# Patient Record
Sex: FEMALE
[De-identification: names, ages, dates, MRNs, and addresses within clinical notes are randomized per-mention and may not be internally consistent; named-entity substitution may affect disease eponyms.]

---

## 2022-09-18 ENCOUNTER — HOSPITAL ENCOUNTER (EMERGENCY)
Dept: HOSPITAL 97 - ER | Age: 47
Discharge: HOME | End: 2022-09-18
Payer: SELF-PAY

## 2022-09-18 DIAGNOSIS — M54.32: Primary | ICD-10-CM

## 2022-09-18 LAB
BUN BLD-MCNC: 6 MG/DL (ref 7–18)
GLUCOSE SERPLBLD-MCNC: 122 MG/DL (ref 74–106)
HCT VFR BLD CALC: 41.4 % (ref 36–45)
LYMPHOCYTES # SPEC AUTO: 1.4 K/UL (ref 0.7–4.9)
MCV RBC: 93.4 FL (ref 80–100)
PMV BLD: 9.4 FL (ref 7.6–11.3)
POTASSIUM SERPL-SCNC: 3.5 MMOL/L (ref 3.5–5.1)
RBC # BLD: 4.44 M/UL (ref 3.86–4.86)
SP GR UR: 1.02 (ref 1–1.03)

## 2022-09-18 PROCEDURE — 81003 URINALYSIS AUTO W/O SCOPE: CPT

## 2022-09-18 PROCEDURE — 96375 TX/PRO/DX INJ NEW DRUG ADDON: CPT

## 2022-09-18 PROCEDURE — 96374 THER/PROPH/DIAG INJ IV PUSH: CPT

## 2022-09-18 PROCEDURE — 81025 URINE PREGNANCY TEST: CPT

## 2022-09-18 PROCEDURE — 72131 CT LUMBAR SPINE W/O DYE: CPT

## 2022-09-18 PROCEDURE — 99284 EMERGENCY DEPT VISIT MOD MDM: CPT

## 2022-09-18 PROCEDURE — 36415 COLL VENOUS BLD VENIPUNCTURE: CPT

## 2022-09-18 PROCEDURE — 80048 BASIC METABOLIC PNL TOTAL CA: CPT

## 2022-09-18 PROCEDURE — 85025 COMPLETE CBC W/AUTO DIFF WBC: CPT

## 2022-09-18 NOTE — RAD REPORT
EXAM DESCRIPTION:  CT - Spine Lumbar Wo Con - 9/18/2022 4:01 pm

 

CLINICAL HISTORY:  radicular pain

 

COMPARISON:  None.

 

TECHNIQUE:  Thin section axial imaging of the lumbar spine was performed.  Sagittal and coronal recon
struction images were generated and reviewed.

 

All CT scans are performed using dose optimization technique as appropriate and may include automated
 exposure control or mA/KV adjustment according to patient size.

 

FINDINGS:  Lumbar bodies are normal in height. No acute fracture. No lytic, sclerotic or expansile barbara
ny destructive process. There is a very slight retrolisthesis of L1 on L2. Endplate spurs are scatter
ed throughout the lumbar spine. No pathologic bone process seen.

 

L1-2 level: Mild central canal disc bulge is seen with left foraminal disc bulge. Midline canal is 10
 mm. Left foraminal encroachment is minimal.

L2-3 level: Slight loss in disc height noted. Degenerative gas in the disc space. Mild bulging of dis
c material seen across the central canal and into each exit foramen. Bulging disc is more prominent o
n the left. There is facet degenerative change and ligamentous thickening present. Left foraminal aria
nosis is present.

L3-4 level: Mild bulging of disc material is present in the central canal and right exit foramen. Rig
ht facet hypertrophy is present with mild right foraminal stenosis. Central canal is borderline steno
tic. Degenerative gas is present in the disc space.

L4-5 disc level: Midline disc bulge is present. No foraminal stenosis. Facet joint degenerative sebastian
es are present.

L5-S1 level: Slight anterior subluxation of L5 on S1 due to advanced facet joint degenerative change.
 No pars defects seen. Mild bilateral foraminal stenosis present. No central spinal stenosis.

 

IMPRESSION:  No fracture or acute vertebral body finding identifiable.

 

Disc bulge changes are present at multiple levels with borderline central spinal stenosis and multipl
e levels of foraminal stenosis.

 

Central canal detail is inherently limited. Follow-up outpatient MR imaging may be helpful to more se
nsitively assess affects of protruding disc material.

## 2022-09-18 NOTE — EDPHYS
Physician Documentation                                                                           

 [facility]                                                                 

Name: Dorene Zamora                                                                             

Age: 47 yrs                                                                                       

Sex: Female                                                                                       

: 1975                                                                                   

MRN: [id number]                                                                                   

Arrival Date: 2022                                                                          

Time: 15:07                                                                                       

Account#: [id number]                                                                            

Bed Treatment                                                                                     

Private MD:                                                                                       

ED Physician Danny Babcock                                                                         

HPI:                                                                                              

                                                                                             

15:35 This 47 yrs old  Female presents to ER via Ambulatory with complaints of Leg    jmm 

      Pain, Numbness - left leg.                                                                  

15:35 The patient presents with pain. Onset: The symptoms/episode began/occurred gradually, 2 jmm 

      week(s) ago. Modifying factors: The symptoms are alleviated by nothing. the symptoms        

      are aggravated by movement, weight bearing. This is a 47 year old female that presents      

      to the ED with complaints of left lower back pain which radiates into the left leg.         

      Patient states over the past 4 days developing numbness as well. Patient states having      

      a back injury as a child. .                                                                 

15:35 Patient denies bowel or bladder issues.                                                 jmm 

                                                                                                  

Historical:                                                                                       

- Allergies:                                                                                      

15:15 NKA;                                                                                    hb  

                                                                                                  

                                                                                                  

                                                                                                  

ROS:                                                                                              

15:35 Constitutional: Negative for fever, chills, and weight loss, Cardiovascular: Negative   jmm 

      for chest pain, palpitations, and edema, Respiratory: Negative for shortness of breath,     

      cough, wheezing, and pleuritic chest pain.                                                  

15:35 Back: Positive for pain with movement.                                                      

15:35 MS/extremity: Positive for pain.                                                            

15:35 All other systems are negative.                                                             

                                                                                                  

Exam:                                                                                             

15:35 Head/Face:  atraumatic. Eyes:  EOMI, no conjunctival erythema appreciated ENT:  Moist   jmm 

      Mucus Membranes Neck:  Trachea midline, Supple Chest/axilla:  Normal chest wall             

      appearance and motion.   Cardiovascular:  Regular rate and rhythm.  No edema                

      appreciated Respiratory:  Normal respirations, no respiratory distress appreciated          

15:35 Skin:  General appearance color normal                                                      

15:35 Constitutional: The patient appears alert, awake, anxious, uncomfortable.                   

15:35 Abdomen/GI: Inspection: obese Palpation: abdomen is soft and non-tender, in all             

      quadrants.                                                                                  

15:35 Back: pain, that is moderate, of the  lumbar area.                                          

15:35 Musculoskeletal/extremity: ROM: intact in all extremities.                                  

15:35 Skin: Appearance: Color: normal in color.                                                   

15:35 Neuro: Motor: is normal.                                                                    

15:35 Psych: Behavior/mood is anxious.                                                            

                                                                                                  

Vital Signs:                                                                                      

15:12  / 105; Pulse 99; Resp 18; Temp 98.4; Pulse Ox 100% on R/A; Weight 117.93 kg;     hb  

      Height 5 ft. 2 in. (157.48 cm); Pain 10/10;                                                 

16:30  / 109; Pulse 76; Resp 20; Pulse Ox 96% on R/A; Pain 8/10;                        ss  

15:12 Body Mass Index 47.55 (117.93 kg, 157.48 cm)                                            hb  

                                                                                                  

MDM:                                                                                              

15:25 Patient medically screened.                                                             [facility] 

17:11 Data reviewed: vital signs, nurses notes. Counseling: I had a detailed discussion with  earlene 

      the patient and/or guardian regarding: the historical points, exam findings, and any        

      diagnostic results supporting the discharge/admit diagnosis, lab results, radiology         

      results, the need for outpatient follow up, to return to the emergency department if        

      symptoms worsen or persist or if there are any questions or concerns that arise at          

      home. ED course: Pain relieved in the ED. Advised follow-up with spine, pain                

      management, her PCP. Given strict return precautions. Patient understood agrees plan of     

      care. I do not currently suspect cauda equina or cord compression..                         

17:12 Counseling: I had a detailed discussion with the patient and/or guardian regarding:     earlene 

      smoking cessation.                                                                          

                                                                                                  

                                                                                             

15:35 Order name: CBC with Diff; Complete Time: 16:49                                         [facility] 

                                                                                             

15:35 Order name: BMP; Complete Time: 16:50                                                   [facility] 

                                                                                             

15:32 Order name: CT Lumbar Spine Wo Con; Complete Time: 16:27                                [facility] 

                                                                                             

15:45 Order name: Urine Dipstick-Ancillary; Complete Time: 15:46                              [facility]

                                                                                             

15:46 Order name: Urine Pregnancy--Ancillary (enter results); Complete Time: 15:57            eb  

                                                                                             

15:31 Order name: Saline Lock; Complete Time: 16:26                                           [facility] 

                                                                                                  

Administered Medications:                                                                         

16:22 Drug: Dilaudid (HYDROmorphone) 1 mg Route: IVP; Site: left antecubital;                 ss  

16:24 Drug: Ketorolac 30 mg Route: IVP; Site: left antecubital;                               ss  

16:26 Drug: Valium (diazepam) 5 mg Route: IVP; Site: left antecubital;                        ss  

16:26 Drug: Decadron - Dexamethasone 10 mg Route: IVP; Site: left antecubital;                ss  

                                                                                                  

                                                                                                  

Disposition:                                                                                      

17:24 Co-signature as Attending Physician, Danny Babcock DO I was immediately available onsite ms3 

      in the emergency department for consultation in the care of the patient.                    

                                                                                                  

Disposition Summary:                                                                              

22 17:13                                                                                    

Discharge Ordered                                                                                 

      Location: Home                                                                          [facility] 

      Condition: Stable                                                                       [facility] 

      Diagnosis                                                                                   

        - Sciatica, left side                                                                 jm 

      Followup:                                                                               [facility] 

        - With: Aleks Valderrama,                                                                   

        - When: 2 - 3 days                                                                         

        - Reason: Recheck today's complaints, Continuance of care, Re-evaluation by your           

      physician                                                                                   

      Discharge Instructions:                                                                     

        - Discharge Summary Sheet                                                             [facility] 

        - Sciatica                                                                            [facility] 

      Forms:                                                                                      

        - Medication Reconciliation Form                                                      [facility] 

        - Thank You Letter                                                                    [facility] 

        - Antibiotic Education                                                                [facility] 

        - Prescription Opioid Use                                                             [facility] 

      Prescriptions:                                                                              

        - Prednisone 20 mg Oral Tablet                                                             

            - take 3 tablets by ORAL route once daily for 5 days; 15 tablet; Refills: 0,      [facility] 

      Product Selection Permitted                                                                 

        - Zanaflex 4 mg Oral Tablet                                                                

            - take 1 tablet by ORAL route every 8 hours As needed; 20 tablet; Refills: 0,     [facility] 

      Product Selection Permitted                                                                 

        - Diclofenac Sodium 75 mg Oral Tablet Sustained Release                                    

            - take 1 tablet by ORAL route 2 times per day; 30 tablet; Refills: 0, Product     [facility] 

      Selection Permitted                                                                         

        - Tramadol 50 mg Oral Tablet                                                               

            - take 1 tablet by ORAL route every 8 hours as needed; 12 tablet; Refills: 0,     [facility] 

      Product Selection Permitted                                                                 

Signatures:                                                                                       

Dispatcher [employer]                           EDJose Antonio Gamez PA PA jmm Smirch, Shelby, RN RN                                                      

Lacie Beal RN RN                                                      

Danny Babcock DO DO   ms3                                                  

                                                                                                  

**************************************************************************************************
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
## 2022-09-20 VITALS — OXYGEN SATURATION: 96 % | SYSTOLIC BLOOD PRESSURE: 189 MMHG | DIASTOLIC BLOOD PRESSURE: 109 MMHG

## 2022-09-20 VITALS — TEMPERATURE: 98.4 F

## 2023-02-07 LAB
BUN BLD-MCNC: 8 MG/DL (ref 7–18)
GLUCOSE SERPLBLD-MCNC: 107 MG/DL (ref 74–106)
POTASSIUM SERPL-SCNC: 3.6 MMOL/L (ref 3.5–5.1)

## 2023-02-09 ENCOUNTER — HOSPITAL ENCOUNTER (OUTPATIENT)
Dept: HOSPITAL 97 - OR | Age: 48
Discharge: HOME | End: 2023-02-09
Attending: SURGERY
Payer: COMMERCIAL

## 2023-02-09 VITALS — DIASTOLIC BLOOD PRESSURE: 91 MMHG | SYSTOLIC BLOOD PRESSURE: 162 MMHG

## 2023-02-09 VITALS — OXYGEN SATURATION: 99 %

## 2023-02-09 VITALS — TEMPERATURE: 97 F

## 2023-02-09 DIAGNOSIS — K42.0: Primary | ICD-10-CM

## 2023-02-09 PROCEDURE — 36415 COLL VENOUS BLD VENIPUNCTURE: CPT

## 2023-02-09 PROCEDURE — 0WUF4JZ SUPPLEMENT ABDOMINAL WALL WITH SYNTHETIC SUBSTITUTE, PERCUTANEOUS ENDOSCOPIC APPROACH: ICD-10-PCS

## 2023-02-09 PROCEDURE — 80048 BASIC METABOLIC PNL TOTAL CA: CPT

## 2023-02-09 PROCEDURE — 93005 ELECTROCARDIOGRAM TRACING: CPT

## 2023-02-09 RX ADMIN — CEFAZOLIN ONE GM: 2 INJECTION, POWDER, FOR SOLUTION INTRAMUSCULAR; INTRAVENOUS at 09:40

## 2023-02-09 RX ADMIN — HYDROMORPHONE HYDROCHLORIDE ONE MG: 2 INJECTION INTRAMUSCULAR; INTRAVENOUS; SUBCUTANEOUS at 11:04

## 2023-02-09 RX ADMIN — HYDROMORPHONE HYDROCHLORIDE ONE MG: 1 INJECTION, SOLUTION INTRAMUSCULAR; INTRAVENOUS; SUBCUTANEOUS at 11:28

## 2023-02-09 RX ADMIN — HYDROMORPHONE HYDROCHLORIDE ONE MG: 2 INJECTION INTRAMUSCULAR; INTRAVENOUS; SUBCUTANEOUS at 10:51

## 2023-02-09 RX ADMIN — HYDROMORPHONE HYDROCHLORIDE ONE MG: 2 INJECTION INTRAMUSCULAR; INTRAVENOUS; SUBCUTANEOUS at 10:58

## 2023-02-09 RX ADMIN — HYDROMORPHONE HYDROCHLORIDE ONE MG: 1 INJECTION, SOLUTION INTRAMUSCULAR; INTRAVENOUS; SUBCUTANEOUS at 11:17

## 2023-02-09 RX ADMIN — HYDROMORPHONE HYDROCHLORIDE ONE MG: 2 INJECTION INTRAMUSCULAR; INTRAVENOUS; SUBCUTANEOUS at 11:11

## 2023-02-09 RX ADMIN — CEFAZOLIN ONE GM: 2 INJECTION, POWDER, FOR SOLUTION INTRAMUSCULAR; INTRAVENOUS at 09:29

## 2023-02-09 NOTE — P.OP
Preoperative diagnosis: Umbilical Hernia - Incarcerated


Postoperative diagnosis: Umbilical Hernia - Incarcerated


Primary procedure: Laparoscopic Umbilical Hernia Repair


Anesthesia: GETA + Local


Estimated blood loss: < 10cc


Specimen: none


Findings: Incarcerated omental henria - 5cm in size


Complications: None


Implants: Bard Ventralite ST 11.4cm Round, sorbafix


Transferred to: Recovery Room


Condition: Good

## 2023-02-09 NOTE — OP
Date of Procedure:  02/09/2023



Surgeon:  Mateo Garg MD, MD



Preoperative Diagnosis:  Umbilical hernia, incarcerated.



Postoperative Diagnosis:  Umbilical hernia, incarcerated.



Procedure Performed:  Laparoscopic umbilical hernia repair with mesh.



Anesthesia:  General endotracheal plus local.



Estimated Blood Loss:  Less than 10 cc.



Specimen:  None.



Findings:  Incarcerated omental hernia, approximately 5 cm hernia neck.



Complications:  None.



Implants:  Bard Ventralight ST mesh with Echo Positioning System, 11.4 cm round mesh utilized and Sor
baFix absorbable fixation tacks, approximately 55 used.



Disposition:  The patient was transferred to the recovery in good condition.



Procedure In Detail:  After informed consent was obtained, the patient was brought to the operating r
oom, prepped and draped in the usual sterile fashion after adequate anesthesia was achieved.  An area
 of the left upper quadrant was anesthetized with 0.25% Marcaine, sharply incised.  A 5 mm trocar was
 placed under direct visualization evidence of complication.  Insufflation was obtained to 15 mmHg at
 this time.  There was no injury to vital structure upon entry into the abdomen.  At this point, an a
dditional trocar was chosen in the left mid abdomen.  This was similarly anesthetized, sharply incise
d, and a 12 mm trocar was placed under direct visualization evidence of complication.  At this point,
 an omental incarcerated hernia was appreciated.  I took down the omental entrapped hernia using a co
mbination of blunt dissection as well as the EnSeal ligation device.  All the omentum was returned to
 the normal anatomic position.  There was no resection required as the hernia sac was able to be diss
ected free from the omental contents.  At this point, I inspected the area, found to be approximately
 5 cm in size and therefore I chose an 11.4 cm Bard Ventralight mesh.  I first brought in an Endo Sti
tc with V-Loc suture.  The 0 suture was used to run the defect closed, imbricated the hernia sac in 
a running fashion with 2 sutures utilized.  There was good apposition of the abdominal wall closure. 
 At this point, the 11.4 cm Bard Ventralight ST mesh with Echo Positioning System was deployed in the
 abdomen.  I made a stab incision in the infraumbilical position and positioned the mesh circumferent
ially over the defect with minimum of 5 cm underlay.  At this point, the SorbaFix absorbable fixation
 tack was used to secure a single crown at this point.  I then removed the balloon deployment system 
and found it to be intact on the back table.  At this point, I placed a second crown.  A total of 55 
tacks were utilized to secure the mesh to the anterior abdominal wall with good apposition of mesh to
 the abdominal wall.  No bleeding or other hemostatic maneuvers were required.  The patient's positio
n slightly rolled away.  I then closed the 12 mm trocar site using a [place] suture passer wi
th 0 Vicryl in an interrupted fashion with good approximation of tissues.  The abdomen was completely
 desufflated under direct visualization without evidence of complication.  Remaining trocars were rem
bill.  All skin incisions were copiously irrigated and closed with a 4-0 Monocryl in running fashion.
  Dermabond placed over top.  The patient tolerated the procedure well without evidence of complicati
on and transferred to PACU in good condition.  All counts were correct at the end of the case.





MANDO/AGUEDA

DD:  02/09/2023 10:38:39Voice ID:  [id number]

DT:  02/09/2023 10:56:25Report ID:  [id number]

## 2023-02-09 NOTE — EKG
Test Date:    2023-02-07               Test Time:    09:35:14

Technician:   PRISCILA                                     

                                                     

MEASUREMENT RESULTS:                                       

Intervals:                                           

Rate:         61                                     

SD:           144                                    

QRSD:         94                                     

QT:           402                                    

QTc:          404                                    

Axis:                                                

P:            50                                     

SD:           144                                    

QRS:          88                                     

T:            5                                      

                                                     

INTERPRETIVE STATEMENTS:                                       

                                                     

Normal sinus rhythm

Normal ECG

No previous ECG available for comparison



Electronically Signed On 02-09-23 07:57:10 CST by Naga Leyva

## 2024-08-24 ENCOUNTER — HOSPITAL ENCOUNTER (INPATIENT)
Dept: HOSPITAL 97 - ER | Age: 49
LOS: 3 days | Discharge: HOME | DRG: 65 | End: 2024-08-27
Attending: STUDENT IN AN ORGANIZED HEALTH CARE EDUCATION/TRAINING PROGRAM | Admitting: FAMILY MEDICINE
Payer: COMMERCIAL

## 2024-08-24 VITALS — BODY MASS INDEX: 45.1 KG/M2

## 2024-08-24 DIAGNOSIS — Z79.899: ICD-10-CM

## 2024-08-24 DIAGNOSIS — I63.9: Primary | ICD-10-CM

## 2024-08-24 DIAGNOSIS — F41.9: ICD-10-CM

## 2024-08-24 DIAGNOSIS — Z79.02: ICD-10-CM

## 2024-08-24 DIAGNOSIS — I10: ICD-10-CM

## 2024-08-24 DIAGNOSIS — F17.210: ICD-10-CM

## 2024-08-24 DIAGNOSIS — I16.1: ICD-10-CM

## 2024-08-24 DIAGNOSIS — R29.810: ICD-10-CM

## 2024-08-24 DIAGNOSIS — Z28.310: ICD-10-CM

## 2024-08-24 DIAGNOSIS — R29.701: ICD-10-CM

## 2024-08-24 DIAGNOSIS — E66.01: ICD-10-CM

## 2024-08-24 LAB
ALBUMIN SERPL BCP-MCNC: 3.4 G/DL (ref 3.4–5)
ALBUMIN/GLOB SERPL: 0.8 {RATIO} (ref 1.1–1.8)
ALP SERPL-CCNC: 123 U/L (ref 45–117)
ALT SERPL W P-5'-P-CCNC: 24 U/L (ref 13–56)
ANION GAP SERPL CALC-SCNC: 5.9 MEQ/L (ref 5–15)
AST SERPL W P-5'-P-CCNC: 15 U/L (ref 15–37)
BILIRUB INDIRECT SERPL-MCNC: 0.1 MG/DL (ref 0.2–0.8)
BUN BLD-MCNC: 8 MG/DL (ref 7–18)
GLOBULIN SER CALC-MCNC: 4.1 G/DL (ref 2.3–3.5)
GLUCOSE SERPLBLD-MCNC: 97 MG/DL (ref 74–106)
HCT VFR BLD CALC: 40.5 % (ref 36–45)
HGB BLD-MCNC: 13.5 G/DL (ref 12–15)
INR BLD: 1.09
LYMPHOCYTES # SPEC AUTO: 2.6 K/UL (ref 0.7–4.9)
MAGNESIUM SERPL-MCNC: 2.1 MG/DL (ref 1.6–2.4)
MCH RBC QN AUTO: 30.3 PG (ref 27–35)
MCHC RBC AUTO-ENTMCNC: 33.3 G/DL (ref 32–36)
MCV RBC: 91 FL (ref 80–100)
NRBC # BLD: 0 10*3/UL (ref 0–0)
NRBC BLD AUTO-RTO: 0 % (ref 0–0)
NT-PROBNP SERPL-MCNC: 124 PG/ML (ref ?–125)
PMV BLD: 9.9 FL (ref 7.6–11.3)
POTASSIUM SERPL-SCNC: 2.9 MEQ/L (ref 3.5–5.1)
PROTHROMBIN TIME: 12.2 SECONDS (ref 9.4–12.5)
RBC # BLD: 4.46 M/UL (ref 3.86–4.86)
TROPONIN I SERPL HS-MCNC: 7.8 PG/ML (ref ?–58.9)
WBC # BLD AUTO: 10.1 THOU/UL (ref 4.3–10.9)

## 2024-08-24 PROCEDURE — 83036 HEMOGLOBIN GLYCOSYLATED A1C: CPT

## 2024-08-24 PROCEDURE — 84132 ASSAY OF SERUM POTASSIUM: CPT

## 2024-08-24 PROCEDURE — 81001 URINALYSIS AUTO W/SCOPE: CPT

## 2024-08-24 PROCEDURE — 84484 ASSAY OF TROPONIN QUANT: CPT

## 2024-08-24 PROCEDURE — 70496 CT ANGIOGRAPHY HEAD: CPT

## 2024-08-24 PROCEDURE — 83880 ASSAY OF NATRIURETIC PEPTIDE: CPT

## 2024-08-24 PROCEDURE — 80076 HEPATIC FUNCTION PANEL: CPT

## 2024-08-24 PROCEDURE — 80053 COMPREHEN METABOLIC PANEL: CPT

## 2024-08-24 PROCEDURE — 70450 CT HEAD/BRAIN W/O DYE: CPT

## 2024-08-24 PROCEDURE — 80048 BASIC METABOLIC PNL TOTAL CA: CPT

## 2024-08-24 PROCEDURE — 80061 LIPID PANEL: CPT

## 2024-08-24 PROCEDURE — 82947 ASSAY GLUCOSE BLOOD QUANT: CPT

## 2024-08-24 PROCEDURE — 96374 THER/PROPH/DIAG INJ IV PUSH: CPT

## 2024-08-24 PROCEDURE — 70498 CT ANGIOGRAPHY NECK: CPT

## 2024-08-24 PROCEDURE — 70551 MRI BRAIN STEM W/O DYE: CPT

## 2024-08-24 PROCEDURE — 85025 COMPLETE CBC W/AUTO DIFF WBC: CPT

## 2024-08-24 PROCEDURE — 85610 PROTHROMBIN TIME: CPT

## 2024-08-24 PROCEDURE — 93005 ELECTROCARDIOGRAM TRACING: CPT

## 2024-08-24 PROCEDURE — 36415 COLL VENOUS BLD VENIPUNCTURE: CPT

## 2024-08-24 PROCEDURE — 99285 EMERGENCY DEPT VISIT HI MDM: CPT

## 2024-08-24 PROCEDURE — 93306 TTE W/DOPPLER COMPLETE: CPT

## 2024-08-24 PROCEDURE — 83735 ASSAY OF MAGNESIUM: CPT

## 2024-08-24 NOTE — ER
Nurse's Notes                                                                                     

 [facility]                                                                 

Name: Dorene Zamora                                                                             

Age: 49 yrs                                                                                       

Sex: Female                                                                                       

: 1975                                                                                   

MRN: [id number]                                                                                   

Arrival Date: 2024                                                                          

Time: 18:57                                                                                       

Account#: [id number]                                                                            

Bed 20                                                                                            

Private MD:                                                                                       

Diagnosis: Essential (primary) hypertension;Left-sided Bell's palsy, subacute CVA, subacute       

  ischemic infarct, hypertensive emergency, uncontrolled hypertension                             

                                                                                                  

Presentation:                                                                                     

                                                                                             

19:28 Chief complaint: Patient states: facial droop since Wednesday night. Coronavirus        vc1 

      screen: Vaccine status: Patient reports being unvaccinated. Client denies travel out of     

      the [place] in the last 14 days. At this time, the client does not indicate any symptoms       

      associated with coronavirus-19. Ebola Screen: Patient negative for fever greater than       

      or equal to 101.5 degrees Fahrenheit, and additional compatible Ebola Virus Disease         

      symptoms Patient denies exposure to infectious person. Patient denies travel to an          

      Ebola-affected area in the 21 days before illness onset. No symptoms or risks               

      identified at this time. Initial Sepsis Screen: Does the patient meet any 2 criteria?       

      No. Patient's initial sepsis screen is negative. Does the patient have a suspected          

      source of infection? No. Patient's initial sepsis screen is negative. Risk Assessment:      

      Do you want to hurt yourself or someone else? Patient reports no desire to harm self or     

      others. Onset of symptoms was 2024.                                              

19:28 Method Of Arrival: Ambulatory                                                           vc1 

19:28 Acuity: JOSIE 3                                                                           vc1 

                                                                                                  

Triage Assessment:                                                                                

19:32 General: Appears in no apparent distress. uncomfortable, obese, Behavior is             vc1 

      cooperative, anxious, crying. Pain: Denies pain. EENT: No deficits noted. No signs          

      and/or symptoms were reported regarding the EENT system. Neuro: Speech is normal,           

      Facial droop on left. Cardiovascular: Capillary refill < 3 seconds Patient's skin is        

      warm and dry. Respiratory: Airway is patent Respiratory effort is even, unlabored,          

      Respiratory pattern is regular, symmetrical. GI: No deficits noted. No signs and/or         

      symptoms were reported involving the gastrointestinal system. : No deficits noted. No     

      signs and/or symptoms were reported regarding the genitourinary system. Derm: Skin is       

      intact, is healthy with good turgor, Skin is dry, Skin is normal, Skin temperature is       

      warm.                                                                                       

                                                                                                  

OB/GYN:                                                                                           

19:32 LMP N/A - Post-menopause, Not pregnant                                                  vc1 

                                                                                                  

Historical:                                                                                       

- Allergies:                                                                                      

19:31 NKA;                                                                                    vc1 

- Home Meds:                                                                                      

19:31 None [Active];                                                                          vc1 

- PMHx:                                                                                           

19:31 Hypertensive disorder;                                                                  vc1 

- PSHx:                                                                                           

19:31 Hernia repair;                                                                          vc1 

                                                                                                  

- Immunization history:: Client reports having NOT received the Covid vaccine.                    

- Infectious Disease History:: Denies.                                                            

- Family history:: not pertinent.                                                                 

- Social history:: Smoking status: Patient reports the use of cigarette tobacco                   

  products, smokes one pack cigarettes per day.                                                   

                                                                                                  

                                                                                                  

Screenin:30 [place] Swallow Protocol Exclusion Criteria: Unable to remain alert for testing: No NPO    me1 

      for medical/surgical reason by provider order No Head-of-bed restricted <30 degrees Yes     

      Tracheostomy tube present No No thin liquids due to preexisting dysphagia/baseline          

      modified diet thickened liquids No Exclusion Criteria Result: Proceed Brief Cognitive       

      Screen What is your name? Normal, Where are you right now? Normal, What year is it?         

      Normal. Oral Mechanism Examination Facial Symmetry: Normal, 3 oz Water Swallow              

      Challenge: Pt able to drink all water without stopping, coughing, choking or throat         

      clearing: Yes Result: PASS MD Notified: Adiel Mendoza MD.                                

19:31 [facility] ED Fall Risk Assessment (Adult) History of falling in the last 3 months,       vc1 

      including since admission No falls in past 3 months (0 pts) Confusion or Disorientation     

      No (0 pts) Intoxicated or Sedated No (0 pts) Impaired Gait No (0 pts) Mobility Assist       

      Device Used No (0 pt) Altered Elimination No (0 pt) Score/Fall Risk Level 0 - 2 = Low       

      Risk Oriented to surroundings, Maintained a safe environment, Educated pt \T\ family on     

      fall prevention, incl call for assistance when getting out of bed. Abuse screen: Denies     

      threats or abuse. Nutritional screening: No deficits noted. Tuberculosis screening: No      

      symptoms or risk factors identified.                                                        

                                                                                                  

Assessment:                                                                                       

19:30 Pain: Denies pain. Neuro: Level of Consciousness is awake, alert, obeys commands,       me1 

      Oriented to person, place, time, situation, Appropriate for age. Neuro:  are equal     

      bilaterally Moves all extremities. Full function Gait is steady, Speech is normal,          

      Facial droop on left, Pupils are PERRLA, Intact Reports left sided facial droop since       

      Wednesday. Cardiovascular: Patient's skin is warm and dry. Respiratory: Airway is           

      patent Respiratory effort is even, unlabored, Respiratory pattern is regular,               

      symmetrical. GI: No signs and/or symptoms were reported involving the gastrointestinal      

      system. : No signs and/or symptoms were reported regarding the genitourinary system.      

      EENT: No signs and/or symptoms were reported regarding the EENT system. Derm: Skin is       

      intact, is healthy with good turgor, Skin is pink, warm \T\ dry. Musculoskeletal: No        

      signs and/or symptoms reported regarding the musculoskeletal system.                        

19:30 General: Appears comfortable, well groomed, well developed, well nourished, Behavior is me1 

      cooperative, appropriate for age, anxious, Reports left sided facial droop since            

      Wednesday.                                                                                  

                                                                                                  

Vital Signs:                                                                                      

19:28  / 101; Pulse 68; Resp 20; Temp 98.4; Pulse Ox 100% ; Weight 108.86 kg; Height 5  vc1 

      ft. 2 in. ; Pain 0/10;                                                                      

20:00  / 101; Pulse 66; Resp 17; Pulse Ox 100% ;                                        me1 

21:00  / 102; Pulse 67; Resp 16; Pulse Ox 97% ;                                         me1 

22:00  / 102; Pulse 71; Resp 16; Pulse Ox 98% on R/A;                                   me1 

23:00  / 105; Pulse 86; Resp 15; Pulse Ox 98% on R/A;                                   me1 

23:51  / 80; Pulse 85; Resp 16; Pulse Ox 99% on R/A;                                    me1 

                                                                                             

00:00  / 63; Pulse 77; Resp 18; Pulse Ox 98% on R/A;                                    al5 

00:15  / 74; Pulse 73; Resp 18; Pulse Ox 97% on R/A;                                    al5 

                                                                                             

19:28 Body Mass Index 43.90 (108.86 kg, 157.48 cm)                                            vc1 

                                                                                             

19:28 Pain Scale: Adult                                                                       vc1 

                                                                                                  

NIH Stroke Scale Scores:                                                                          

                                                                                             

19:19 NIHSS Score: 1                                                                          sp4 

19:30 NIHSS Score: 2                                                                          [facility] 

                                                                                                  

ED Course:                                                                                        

18:59 Patient arrived in ED.                                                                  ra3 

19:06 Adiel Mendoza MD is Attending Physician.                                           sp4 

19:30 Triage completed.                                                                       vc1 

19:30 Provided Education on: POC. Verbalized understanding. .                                 me1 

19:30 No provider procedures requiring assistance completed.                                  me1 

19:32 Arm band placed on left wrist.                                                          vc1 

19:32 Patient has correct armband on for positive identification. Bed in low position. Call   vc1 

      light in reach. Pulse ox on. NIBP on.                                                       

19:54 CT Head Brain wo Cont In Process Unspecified.                                           EDMS

20:09 Laurie Cisneros, RN is Primary Nurse.                                                me1 

20:17 Initial lab(s) drawn, by me, sent to lab. Inserted saline lock: 22 gauge in right       me1 

      antecubital area, using aseptic technique.                                                  

20:17 Basic Metabolic Panel Sent.                                                             me1 

20:17 CBC with Diff Sent.                                                                     me1 

20:17 LFT's Sent.                                                                             me1 

20:17 Magnesium Sent.                                                                         me1 

20:17 NT PRO-BNP Sent.                                                                        me1 

20:17 PT-INR Sent.                                                                            me1 

20:18 Troponin HS Sent.                                                                       me1 

21:58 CT Neck Angio In Process Unspecified.                                                   EDMS

21:58 Head angio In Process Unspecified.                                                      EDMS

23:19 Chintan Gillette MD is Hospitalizing Provider.                                           sp4 

                                                                                             

01:28 Patient admitted, IV remains in place.                                                  al5 

                                                                                                  

Administered Medications:                                                                         

                                                                                             

19:59 Drug: Diazepam PO 5 mg PO once Route: PO;                                               vc1 

22:12 Follow up: Response: No adverse reaction; Anxiety unchanged                             me1 

19:59 Drug: HydrALAZINE PO 50 mg PO once Route: PO;                                           vc1 

22:12 Follow up: Response: No adverse reaction; Blood pressure is unchanged                   me1 

22:11 Drug: Aspirin PO Chewable Tablet 81 mg PO once Route: PO;                               me1 

23:44 Follow up: Response: No adverse reaction                                                me1 

22:11 Drug: Atorvastatin PO 40 mg PO once Route: PO;                                          me1 

23:44 Follow up: Response: No adverse reaction                                                me1 

22:11 Drug: Clopidogrel PO 75 mg PO once Route: PO;                                           me1 

23:44 Follow up: Response: No adverse reaction                                                me1 

22:11 Drug: hydrALAZINE IVP 10 mg IVP once Route: IVP; Site: right antecubital;               me1 

23:44 Follow up: Response: No adverse reaction; Blood pressure is lowered                     me1 

22:11 Drug: Losartan  mg PO once Route: PO;                                             me1 

23:45 Follow up: Response: No adverse reaction; Blood pressure is lowered                     me1 

22:11 Drug: Diazepam PO 5 mg PO once Route: PO;                                               me1 

23:45 Follow up: Response: No adverse reaction; Anxiety decreased                             me1 

23:50 Drug: HydrALAZINE PO 50 mg PO once Route: PO;                                           me1 

                                                                                             

00:33 Follow up: Response: No adverse reaction; Blood pressure is lowered                     al5 

                                                                                                  

                                                                                                  

Medication:                                                                                       

                                                                                             

19:32 VIS not applicable for this client.                                                     vc1 

                                                                                                  

Outcome:                                                                                          

23:19 Decision to Hospitalize by Provider.                                                    sp4 

                                                                                             

01:28 Admitted to Tele accompanied by tech, via wheelchair, room 219, with chart,             al5 

      Condition: good                                                                             

      Instructed on the need for admit,                                                           

01:29 Patient left the ED.                                                                    al5 

                                                                                                  

                                                                                                  

NIH Stroke Scale - NIH Stroke Score                                                               

Date: 2024                                                                                  

Time: 19:19                                                                                       

Total Score = 1                                                                                   

  10. Dysarthria (speech clarity - read or repeat words) - 0(Normal)                              

  11. Extinction and Inattention (visual/tactile/auditory/spatial/personal) - 0(No                

      abnormality)                                                                                

  1a. Level of Consciousness (LOC) - 0(Alert)                                                     

  1b. Level of Consciousness (LOC) (Month \T\ Age) - 0(Both)                                      

  1c. LOC Commands (Open \T\ Closes Eyes/) - 0(Both)                                          

   2. Best Gaze (Lateral Gaze Paresis) - 0(Normal)                                                

   3. Visual Field Loss - 0(No visual loss)                                                       

   4. Facial Palsy - 1(Minor Paralysis)                                                           

  5a. Left Arm: Motor (10-second hold) - 0(No drift)                                              

  5b. Right Arm: Motor (10-second hold) - 0(No drift)                                             

  6a. Left Leg: Motor (5-second hold - always test supine) - 0(No drift)                          

  6b. Right Leg: Motor (5-second hold - always test supine) - 0(No drift)                         

   7. Limb Ataxia (finger/nose \T\ heel/shin - test with eyes open) - 0(Absent)                   

   8. Sensory Loss (pinprick arms/legs/face) - 0(Normal)                                          

   9. Best Language: Aphasia (description/naming/reading) - 0(No aphasia)                         

Initials: sp4                                                                                     

                                                                                                  

                                                                                                  

NIH Stroke Scale - NIH Stroke Score                                                               

Date: 2024                                                                                  

Time: 19:30                                                                                       

Total Score = 2                                                                                   

  10. Dysarthria (speech clarity - read or repeat words) - 0(Normal)                              

  11. Extinction and Inattention (visual/tactile/auditory/spatial/personal) - 0(No                

      abnormality)                                                                                

  1a. Level of Consciousness (LOC) - 0(Alert)                                                     

  1b. Level of Consciousness (LOC) (Month \T\ Age) - 0(Both)                                      

  1c. LOC Commands (Open \T\ Closes Eyes/) - 0(Both)                                          

   2. Best Gaze (Lateral Gaze Paresis) - 0(Normal)                                                

   3. Visual Field Loss - 0(No visual loss)                                                       

   4. Facial Palsy - 2(Partial paralysis)                                                         

  5a. Left Arm: Motor (10-second hold) - 0(No drift)                                              

  5b. Right Arm: Motor (10-second hold) - 0(No drift)                                             

  6a. Left Leg: Motor (5-second hold - always test supine) - 0(No drift)                          

  6b. Right Leg: Motor (5-second hold - always test supine) - 0(No drift)                         

   7. Limb Ataxia (finger/nose \T\ heel/shin - test with eyes open) - 0(Absent)                   

   8. Sensory Loss (pinprick arms/legs/face) - 0(Normal)                                          

   9. Best Language: Aphasia (description/naming/reading) - 0(No aphasia)                         

Initials: me1                                                                                     

                                                                                                  

Signatures:                                                                                       

Dispatcher [employer]                           EDMS                                                 

Maru Allen RN                    RN   vc1                                                  

Adiel Mendoza MD MD   sp4                                                  

Laurie Cisneros RN                  RN   me1                                                  

Ana Maria Morton                                   ra3                                                  

Renetta Mckeon RN                   RN   al5                                                  

                                                                                                  

Corrections: (The following items were deleted from the chart)                                    

                                                                                             

23:43 23:39 General: Appears comfortable, well groomed, well developed, well          me1         

      nourished, Behavior is cooperative, appropriate for age, anxious, Reports left              

      sided facial droop since Wednesday me1                                                      

23:43 23:39 Pain: Denies pain. me1                                                    me1         

:43 23:39 Neuro: Level of Consciousness is awake, alert, obeys commands, Oriented   me1         

      to person, place, time, situation, Appropriate for age me1                                  

23:43 23:39 Cardiovascular: Patient's skin is warm and dry. me1                       me1         

23:43 23:39 Respiratory: Airway is patent Respiratory effort is even, unlabored,      me1         

      Respiratory pattern is regular, symmetrical, me1                                            

: 23:39 GI: No signs and/or symptoms were reported involving the gastrointestinal me1         

      system. me1                                                                                 

 23:39 : No signs and/or symptoms were reported regarding the genitourinary    me1         

      system. me1                                                                                 

 23:39 EENT: No signs and/or symptoms were reported regarding the EENT system.   me1         

      me1                                                                                         

 23:39 Derm: Skin is intact, is healthy with good turgor, Skin is pink, warm \T\   me1       

      dry. me1                                                                                    

 23:39 Musculoskeletal: No signs and/or symptoms reported regarding the          me1         

      musculoskeletal system. me1                                                                 

 23:39 Neuro:  are equal bilaterally Moves all extremities. Full function   me1         

      Gait is steady, Speech is normal, Facial droop on left, Pupils are PERRLA,                  

      Intact Reports left sided facial droop since Wednesday. me1                                 

                                                                                                  

**************************************************************************************************

## 2024-08-24 NOTE — EDPHYS
Physician Documentation                                                                           

 [facility]                                                                 

Name: Dorene Zamora                                                                             

Age: 49 yrs                                                                                       

Sex: Female                                                                                       

: 1975                                                                                   

MRN: [id number]                                                                                   

Arrival Date: 2024                                                                          

Time: 18:57                                                                                       

Account#: [id number]                                                                            

Bed 20                                                                                            

Private MD:                                                                                       

ED Physician Adiel Mendoza                                                                    

HPI:                                                                                              

                                                                                             

19:06 This 49 yrs old  Female presents to ER via [facility] with complaints of Facial sp4 

      Droop.                                                                                      

19:19 Patient presents with 4 days of left-sided facial asymmetry associated with facial      sp4 

      discomfort also with some biting on the left side of the lip. States this problem           

      occurred on Wednesday and has been persistent and causing her anxiety. .                    

                                                                                                  

OB/GYN:                                                                                           

19:32 LMP N/A - Post-menopause, Not pregnant                                                  vc1 

                                                                                                  

Historical:                                                                                       

- Allergies:                                                                                      

19:31 NKA;                                                                                    vc1 

- Home Meds:                                                                                      

19:31 None [Active];                                                                          vc1 

- PMHx:                                                                                           

19:31 Hypertensive disorder;                                                                  vc1 

- PSHx:                                                                                           

19:31 Hernia repair;                                                                          vc1 

                                                                                                  

- Immunization history:: Client reports having NOT received the Covid vaccine.                    

- Infectious Disease History:: Denies.                                                            

- Family history:: not pertinent.                                                                 

- Social history:: Smoking status: Patient reports the use of cigarette tobacco                   

  products, smokes one pack cigarettes per day.                                                   

                                                                                                  

                                                                                                  

ROS:                                                                                              

19:19 Constitutional: Negative for fever, chills, and weight loss, positive facial asymmetry, sp4 

      negative left-sided weakness                                                                

19:19 All other systems are negative,                                                             

                                                                                                  

Exam:                                                                                             

19:19 Constitutional:  This is a well developed, well nourished patient who is awake, alert,  sp4 

      and in no acute distress. Head/Face:  Normocephalic, atraumatic. Eyes:  Pupils equal        

      round and reactive to light, extra-ocular motions intact.  Lids and lashes normal.          

      Conjunctiva and sclera are not injected.  Cornea within normal limits.  Periorbital         

      areas with no swelling, redness, or edema. ENT:  Nares patent. No nasal discharge, no       

      septal abnormalities noted.  Tympanic membranes are normal and external auditory canals     

      are clear.  Oropharynx with no redness, swelling, or masses, exudates, or evidence of       

      obstruction, uvula midline.  Mucous membranes moist. Neck:  Trachea midline, no             

      thyromegaly or masses palpated, and no cervical lymphadenopathy.  Supple, full range of     

      motion without nuchal rigidity, or vertebral point tenderness.  Chest/axilla:  Normal       

      chest wall appearance and motion.  Nontender with no deformity.  No lesions are             

      appreciated. Cardiovascular:  Regular rate and rhythm with a normal S1 and S2.  No          

      gallops, murmurs, or rubs.  Normal PMI, no JVD.  No pulse deficits. Respiratory:  Lungs     

      have equal breath sounds bilaterally, clear to auscultation and percussion.  No rales,      

      rhonchi or wheezes noted.  No increased work of breathing, no retractions or nasal          

      flaring. Abdomen/GI:  Soft,  with normal bowel sounds.  No distension or tympany.  No       

      guarding or rebound.  No evidence of tenderness throughout. Back:  No spinal                

      tenderness.  No costovertebral tenderness.     Skin:  Warm, dry with normal turgor.         

      Normal color with no rashes, no lesions, and no evidence of cellulitis. MS/ Extremity:      

      Pulses equal, no cyanosis.  Neurovascular intact.  Full, normal range of motion. Neuro:     

       Awake and alert, GCS 15, oriented to person, place, time, and situation.  grossly          

      intact.  Motor strength 5/5 in all extremities.  Sensory grossly intact.   There is         

      left facial nerve paralysis , Isolated to motor branch,  consistent with Left Bell's        

      palsy,  Other Cranal Nerve exam is normal  Psych:  Awake, alert, with orientation to        

      person, place and time.  Behavior, mood, and affect are within normal limits,  Very         

      anxious appearing                                                                           

                                                                                             

00:10 ECG was reviewed by the Attending Physician. EKG at     normal sinus rhythm rate 65 sp4 

      otherwise unremarkable EKG                                                                  

                                                                                                  

Vital Signs:                                                                                      

                                                                                             

19:28  / 101; Pulse 68; Resp 20; Temp 98.4; Pulse Ox 100% ; Weight 108.86 kg; Height 5  vc1 

      ft. 2 in. ; Pain 0/10;                                                                      

20:00  / 101; Pulse 66; Resp 17; Pulse Ox 100% ;                                        me1 

21:00  / 102; Pulse 67; Resp 16; Pulse Ox 97% ;                                         me1 

22:00  / 102; Pulse 71; Resp 16; Pulse Ox 98% on R/A;                                   me1 

23:00  / 105; Pulse 86; Resp 15; Pulse Ox 98% on R/A;                                   me1 

23:51  / 80; Pulse 85; Resp 16; Pulse Ox 99% on R/A;                                    me1 

08/25                                                                                             

00:00  / 63; Pulse 77; Resp 18; Pulse Ox 98% on R/A;                                    al5 

00:15  / 74; Pulse 73; Resp 18; Pulse Ox 97% on R/A;                                    al5 

                                                                                             

19:28 Body Mass Index 43.90 (108.86 kg, 157.48 cm)                                            vc1 

                                                                                             

19:28 Pain Scale: Adult                                                                       vc1 

                                                                                                  

NIH Stroke Scale Scores:                                                                          

                                                                                             

19:19 NIHSS Score: 1                                                                          sp4 

19:30 NIHSS Score: 2                                                                          me1 

                                                                                                  

MDM:                                                                                              

19:06 Patient medically screened.                                                             sp4 

23:16 ED course: EXAM DESCRIPTION: CT - Neck Angio - 2024 9:57 pm CLINICAL HISTORY: sub  sp4 

      acute CVA Headache, drowsiness COMPARISON: TECHNIQUE: CT angiography of the neck            

      vessels was performed with MIPs. All CT scans are performed using dose optimization         

      technique as appropriate and may include automated exposure control or mA/KV adjustment     

      according to patient size. FINDINGS: A left aortic arch is identified with normal three     

      vessel configuration of the great vessels. No significant flow abnormality is seen of       

      the common carotid bilaterally. No significant stenosis is identified involving the         

      cervical segments of both internal carotid arteries. Normal flow is seen within both        

      vertebral arteries. IMPRESSION: No significant flow abnormality of the neck vessels is      

      identified. . ED course: EXAM DESCRIPTION: CT - Head angio - 2024 9:57 pm CLINICAL     

      HISTORY: CVA SUBACUTE COMPARISON: Head Brain Wo Cont dated 2024; Neck Angio dated      

      2024 TECHNIQUE: CT angiography of the head was performed with MIPs. All CT scans       

      are performed using dose optimization technique as appropriate and may include              

      automated exposure control or mA/KV adjustment according to patient size. FINDINGS: No      

      evidence of large vessel occlusion. No evidence of aneurysm is detected. No                 

      flowlimiting stenosis or vascular malformation identified. Antegrade flow is seen in        

      the vertebral arteries. The vertebral arteries are codominant. The visualized dural         

      venous sinuses are patent. IMPRESSION: No significant flow abnormality is detected.. ED     

      course: EXAM DESCRIPTION: CT - Head Brain Wo Cont - 2024 7:52 pm CLINICAL HISTORY:     

      bell Headache, drowsiness COMPARISON: TECHNIQUE: All CT scans are performed using dose      

      optimization technique as appropriate and may include automated exposure control or         

      mA/KV adjustment according to patient size. FINDINGS: No intracranial hemorrhage,           

      hydrocephalus or extra-axial fluid collection.21 mm area diminished density right basal     

      ganglia likely represents a subacute CVA. The paranasal sinuses and mastoids are clear.     

      The calvarium is intact. IMPRESSION: 21 mm subacute CVA right basal ganglia. No             

      hemorrhage is seen. . ED course: Patient stable for admission for BP management and         

      Stroke work up .                                                                            

23:20 Data reviewed: vital signs, nurses notes, lab test result(s), EKG, radiologic studies,  sp4 

      CT scan, plain films. Consideration of Admission/Observation Patient was                    

      admitted/placed on observation. Escalation of care including admission/observation          

      considered. Management of patient was discussed with the following: Hospitalist: Nain DALEY . Consultant: Toño DALEY Neurology .                                                    

                                                                                                  

                                                                                             

19:19 Order name: Basic Metabolic Panel; Complete Time: 21:31                                 sp4 

                                                                                             

19:19 Order name: CBC with Diff; Complete Time: 21:31                                         sp4 

                                                                                             

19:19 Order name: LFT's; Complete Time: 21:31                                                 sp 

                                                                                             

19:19 Order name: Magnesium; Complete Time: 21:31                                             sp4 

                                                                                             

19:19 Order name: NT PRO-BNP; Complete Time: 21:31                                            sp4 

                                                                                             

19:19 Order name: PT-INR; Complete Time: 21:31                                                [facility] 

                                                                                             

19:19 Order name: Troponin HS; Complete Time: 21:31                                           sp4 

                                                                                             

00:13 Order name: Urinalysis w/ reflexes                                                      EDMS

                                                                                             

00:13 Order name: CBC with Automated Diff                                                     EDMS

                                                                                             

00:13 Order name: CBC with Automated Diff                                                     EDMS

                                                                                             

00:13 Order name: Comprehensive Metabolic Panel                                               EDMS

                                                                                             

00:13 Order name: Comprehensive Metabolic Panel                                               [facility]

                                                                                             

19:19 Order name: CT Head Brain wo Cont; Complete Time: 21:31                                 sp4 

                                                                                             

21:35 Order name: CT Neck Angio; Complete Time: 23:56                                         sp4 

                                                                                             

21:45 Order name: Head angio; Complete Time: 23:56                                            EDMS

                                                                                             

00:15 Order name: Brain Wo Cont                                                               EDMS

                                                                                             

19:19 Order name: EKG; Complete Time: 19:19                                                   sp4 

                                                                                             

00:13 Order name: CONS Physician Consult                                                      EDMS

                                                                                             

19:19 Order name: Cardiac monitoring; Complete Time: 20:51                                    sp4 

                                                                                             

19:19 Order name: EKG - Nurse/Tech; Complete Time: 20:51                                      sp4 

                                                                                             

19:19 Order name: IV Saline Lock; Complete Time: 20:17                                        sp4 

                                                                                             

19:19 Order name: Labs collected and sent; Complete Time: 20:17                               sp4 

                                                                                             

19:19 Order name: O2 Per Protocol; Complete Time: 20:17                                       sp4 

                                                                                             

19:19 Order name: O2 Sat Monitoring; Complete Time: 20:17                                     sp4 

                                                                                                  

EC/25                                                                                             

00:10 Rate is 65 beats/min. Rhythm is regular, Normal Sinus Rhythm. QRS Axis is Normal. ND    sp4 

      interval is normal. QRS interval is normal. QT interval is normal. No Q waves. T waves      

      are Normal. No ST changes noted. Clinical impression: No evidence of ischemia.              

      Interpreted by me. Reviewed by me.                                                          

                                                                                                  

Administered Medications:                                                                         

                                                                                             

19:59 Drug: Diazepam PO 5 mg PO once Route: PO;                                               vc1 

22:12 Follow up: Response: No adverse reaction; Anxiety unchanged                             me1 

19:59 Drug: HydrALAZINE PO 50 mg PO once Route: PO;                                           vc1 

22:12 Follow up: Response: No adverse reaction; Blood pressure is unchanged                   me1 

22:11 Drug: Aspirin PO Chewable Tablet 81 mg PO once Route: PO;                               me1 

23:44 Follow up: Response: No adverse reaction                                                me1 

22:11 Drug: Atorvastatin PO 40 mg PO once Route: PO;                                          me1 

23:44 Follow up: Response: No adverse reaction                                                me1 

22:11 Drug: Clopidogrel PO 75 mg PO once Route: PO;                                           me1 

23:44 Follow up: Response: No adverse reaction                                                me1 

22:11 Drug: hydrALAZINE IVP 10 mg IVP once Route: IVP; Site: right antecubital;               me1 

23:44 Follow up: Response: No adverse reaction; Blood pressure is lowered                     me1 

22:11 Drug: Losartan  mg PO once Route: PO;                                             me1 

23:45 Follow up: Response: No adverse reaction; Blood pressure is lowered                     me1 

22:11 Drug: Diazepam PO 5 mg PO once Route: PO;                                               me1 

23:45 Follow up: Response: No adverse reaction; Anxiety decreased                             me1 

23:50 Drug: HydrALAZINE PO 50 mg PO once Route: PO;                                           me1 

                                                                                             

00:33 Follow up: Response: No adverse reaction; Blood pressure is lowered                     al5 

                                                                                                  

                                                                                                  

Disposition Summary:                                                                              

24 23:19                                                                                    

Hospitalization Ordered                                                                           

 Notes:       Hospitalization Status: Inpatient Admission                                           
  sp4

      Provider: Chintan Gillette 

      Location: Telemetry/MedSurg (Inpatient)                                                 sp4 

      Condition: Stable                                                                       sp4 

      Problem: new                                                                            sp4 

      Symptoms: have improved                                                                 sp4 

      Bed/Room Type: Standard                                                                 sp4 

      Room Assignment: 219(24 00:26)                                                    sp  

      Diagnosis                                                                                   

        - Essential (primary) hypertension                                                    sp4 

        - Left-sided Bell's palsy, subacute CVA, subacute ischemic infarct, hypertensive      sp4 

      emergency, uncontrolled hypertension                                                        

      Forms:                                                                                      

        - Medication Reconciliation Form                                                      sp4 

        - SBAR form                                                                           sp4 

        - Leadership Thank You Letter                                                         sp4 

Critical care time excluding procedures:                                                          

                                                                                             

23:19 Critical care time: Bedside Care: 36 minutes, Consultation: 12 minutes, Family          sp4 

      Intervention: 12 minutes. Total time: 60 minutes                                            

                                                                                                  

                                                                                                  

NIH Stroke Scale - NIH Stroke Score                                                               

Date: 2024                                                                                  

Time: 19:19                                                                                       

Total Score = 1                                                                                   

  10. Dysarthria (speech clarity - read or repeat words) - 0(Normal)                              

  11. Extinction and Inattention (visual/tactile/auditory/spatial/personal) - 0(No                

      abnormality)                                                                                

  1a. Level of Consciousness (LOC) - 0(Alert)                                                     

  1b. Level of Consciousness (LOC) (Month \T\ Age) - 0(Both)                                      

  1c. LOC Commands (Open \T\ Closes Eyes/) - 0(Both)                                          

   2. Best Gaze (Lateral Gaze Paresis) - 0(Normal)                                                

   3. Visual Field Loss - 0(No visual loss)                                                       

   4. Facial Palsy - 1(Minor Paralysis)                                                           

  5a. Left Arm: Motor (10-second hold) - 0(No drift)                                              

  5b. Right Arm: Motor (10-second hold) - 0(No drift)                                             

  6a. Left Leg: Motor (5-second hold - always test supine) - 0(No drift)                          

  6b. Right Leg: Motor (5-second hold - always test supine) - 0(No drift)                         

   7. Limb Ataxia (finger/nose \T\ heel/shin - test with eyes open) - 0(Absent)                   

   8. Sensory Loss (pinprick arms/legs/face) - 0(Normal)                                          

   9. Best Language: Aphasia (description/naming/reading) - 0(No aphasia)                         

Initials: sp4                                                                                     

                                                                                                  

                                                                                                  

NIH Stroke Scale - NIH Stroke Score                                                               

Date: 2024                                                                                  

Time: 19:30                                                                                       

Total Score = 2                                                                                   

  10. Dysarthria (speech clarity - read or repeat words) - 0(Normal)                              

  11. Extinction and Inattention (visual/tactile/auditory/spatial/personal) - 0(No                

      abnormality)                                                                                

  1a. Level of Consciousness (LOC) - 0(Alert)                                                     

  1b. Level of Consciousness (LOC) (Month \T\ Age) - 0(Both)                                      

  1c. LOC Commands (Open \T\ Closes Eyes/) - 0(Both)                                          

   2. Best Gaze (Lateral Gaze Paresis) - 0(Normal)                                                

   3. Visual Field Loss - 0(No visual loss)                                                       

   4. Facial Palsy - 2(Partial paralysis)                                                         

  5a. Left Arm: Motor (10-second hold) - 0(No drift)                                              

  5b. Right Arm: Motor (10-second hold) - 0(No drift)                                             

  6a. Left Leg: Motor (5-second hold - always test supine) - 0(No drift)                          

  6b. Right Leg: Motor (5-second hold - always test supine) - 0(No drift)                         

   7. Limb Ataxia (finger/nose \T\ heel/shin - test with eyes open) - 0(Absent)                   

   8. Sensory Loss (pinprick arms/legs/face) - 0(Normal)                                          

   9. Best Language: Aphasia (description/naming/reading) - 0(No aphasia)                         

Initials: me1                                                                                     

                                                                                                  

Signatures:                                                                                       

Dispatcher [employer]                           EDTerri Rm Vanessa, RN                    RN   vc1                                                  

Adiel Mendoza MD MD   sp4                                                  

Laurie Cisneros RN RN   me1                                                  

Renetta Mckeon RN   al5                                                  

                                                                                                  

Corrections: (The following items were deleted from the chart)                                    

                                                                                             

00:26  23:19 spSamantha                                                                 sp          

                                                                                                  

**************************************************************************************************

## 2024-08-24 NOTE — RAD REPORT
EXAM DESCRIPTION:  CT - Head Brain Wo Cont - 8/24/2024 7:52 pm

 

CLINICAL HISTORY:  bell

Headache, drowsiness

 

COMPARISON:  <Comparisons>

 

TECHNIQUE:  All CT scans are performed using dose optimization technique as appropriate and may inclu
de automated exposure control or mA/KV adjustment according to patient size.

 

FINDINGS:  No intracranial hemorrhage, hydrocephalus or extra-axial fluid collection.21 mm area dimin
ished density right basal ganglia likely represents a subacute CVA.

 

The paranasal sinuses and mastoids are clear. The calvarium is intact.

 

IMPRESSION:  21 mm subacute CVA right basal ganglia. No hemorrhage is seen.

## 2024-08-24 NOTE — RAD REPORT
EXAM DESCRIPTION:  CT - Neck Angio - 8/24/2024 9:57 pm

 

CLINICAL HISTORY:  sub acute CVA

Headache, drowsiness

 

COMPARISON:  <Comparisons>

 

TECHNIQUE:  CT angiography of the neck vessels was performed with MIPs.

 

All CT scans are performed using dose optimization technique as appropriate and may

include automated exposure control or mA/KV adjustment according to patient size.

 

FINDINGS:  A left aortic arch is identified with normal three vessel configuration of the great vesse
ls.

 

No significant flow abnormality is seen of the common carotid bilaterally.

 

No significant stenosis is identified involving the cervical segments of both internal carotid arteri
es.

 

Normal flow is seen within both vertebral arteries.

 

 

IMPRESSION:  No significant flow abnormality of the neck vessels is identified.

 

 

 

NASCET criteria used.

 

Mild  0-49% stenosis

Moderate 50-69% stenosis

Severe 70-99% stenosis

## 2024-08-24 NOTE — RAD REPORT
EXAM DESCRIPTION:  CT - Head angio - 8/24/2024 9:57 pm

 

CLINICAL HISTORY:  CVA SUBACUTE

 

COMPARISON:  Head Brain Wo Cont dated 8/24/2024; Neck Angio dated 8/24/2024

 

TECHNIQUE:  CT angiography of the head was performed with MIPs.

 

All CT scans are performed using dose optimization technique as appropriate and may

include automated exposure control or mA/KV adjustment according to patient size.

 

FINDINGS:  No evidence of large vessel occlusion. No evidence of aneurysm is detected. No flow-limiti
ng stenosis or vascular malformation identified.

 

Antegrade flow is seen in the vertebral arteries. The vertebral arteries are codominant.

 

The visualized dural venous sinuses are patent.

 

IMPRESSION:  No significant flow abnormality is detected.

## 2024-08-24 NOTE — XMS REPORT
Continuity of Care Document



                           Created on: 2024





LAI, ASHLEY URBINA

External Reference #: [id number]

: 1975

Sex: Female



Demographics





                                        Address             [address] [place]  [address]

 

                                        Home Phone          [phone]

 

                                        Mobile Phone        [phone] )

 

                                        Email Address       [email or web address]

 

                                        Preferred Language  Unknown

 

                                        Marital Status      Unknown

 

                                        Rastafarian Affiliation Unknown

 

                                        Race                Unknown

 

                                        Additional Race(s)  Unavailable

 

                                        Ethnic Group        Unknown





Author





                                        Name                Unknown

 

                                        Address             [address]

[place]  [address]

 

                                        [facility]

[facility]

 

                                        Address             [address]

[place]  [address]

 

                                        Phone               [phone]





Care Team Providers





                                Care Team Member Name Role            Phone

 

                                Unavailable     Unavailable     Unavailable







Encounters





                                                    Start 

Date/Time                               End 

Date/Time                               Encounter 

Type                                    Admission 

Type                                    Attending 

[facility] 

Facility                                Care 

Department                              Encounter 

ID                                      Source

 

                                                    2024 

08:26:48                                2024 

08:26:48   Outpatient                       SFA        SFA        

[id number]                                   [facility]

 

                                                    2024 

11:23:32                                2024 

11:23:32   Outpatient                       SFA        SFA        

[id number]                                   [facility]

 

                                                    2024 

15:41:37                                2024 

15:41:37   Outpatient                       SFA        SFA        

[id number]                                   [facility]

 

                                                    2024 

08:10:52                                2024 

08:10:52   Outpatient                       SFA        SFA        [id number]

[id number]                                   [facility]

 

                                                    2024 

09:08:28                                2024 

09:08:28   Outpatient                       SFA        SFA        

[id number]                                   [facility]

 

                                                    2024 

08:13:24                                2024 

08:13:24   Outpatient                       SFA        SFA        [id number]

[id number]                                   [facility]

 

                                                    2024 

11:09:47                                2024 

11:09:47   Outpatient                       SFA        SFA        [id number]

[id number]                                   [facility]

 

                                                    2024 

14:48:45                                2024 

14:48:45   Outpatient                       SFA        SFA        [id number]

[id number]                                   [facility]

 

                                                    2024 

08:22:15                                2024 

08:22:15   Outpatient                       SFA        SFA        [id number]

[id number]                                   [facility]

 

                                                    2024 

08:07:16                                2024 

08:07:16   Outpatient                       SFA        SFA        [id number]

[id number]                                   [facility]

 

                                                    2023 

09:44:21                                2023 

09:44:21   Outpatient                       SFA        SFA        

[id number]                                   [facility]

 

                                                    2023 

08:30:59                                2023 

08:30:59   Outpatient                       SFA        SFA        

[id number]                                   [facility] 

[facility]

 

                                                    2023-10-30 

07:51:10                                2023-10-30 

07:51:10   Outpatient                       SFA        SFA        

[id number]                                   [facility]

 

                                                    2023-10-02 

14:04:01                                2023-10-02 

14:04:01   Outpatient                       SFA        SFA        

[id number]                                   [facility]

 

                                                    2023-08-10 

15:40:20                                2023-08-10 

15:40:20   Outpatient                       SFA        SFA        

[id number]                                   [facility] 

[facility]

 

                                                    2023 

16:40:44                                2023 

16:40:44   Outpatient                       SFA        SFA        

[id number]                                   [facility] 

[facility]

 

                                                    2023 

10:06:46                                2023 

10:06:46   Outpatient                       SFA        SFA        [id number]

[id number]                                   [facility]

 

                                                    2023 

08:27:16                                2023 

08:27:16   Outpatient                       SFA        SFA        

[id number]                                   [facility] 

[facility]

 

                                                    2023 

15:21:07                                2023 

15:21:07   Outpatient                       SFA        SFA        [id number]

[id number]                                   [facility] 

[facility]

 

                                                    2023 

13:15:02                                2023 

13:15:02   Outpatient                       SFA        SFA        

[id number]                                   [facility] 

[facility]

 

                                                    2023 

11:05:11                                2023 

11:05:11   Outpatient                       SFA        SFA        [id number]

[id number]                                   [facility] 

[facility]

 

                                                    2023 

08:33:17                                2023 

08:33:17   Outpatient                       SFA        SFA        [id number]

[id number]                                   [facility] 

[facility]

 

                                                    2023 

09:28:18                                2023 

09:28:18   Outpatient                       SFA        SFA        [id number]

[id number]                                   [facility]

 

                                                    2023 

10:34:57                                2023 

10:34:57   Outpatient                       SFA        SFA        [id number]

[id number]                                   [facility]

 

                                                    2023 

08:25:09                                2023 

08:25:09   Outpatient                       SFA        SFA        [id number]

[id number]                                   [facility]

 

                                                    2023 

10:45:20                                2023 

10:45:20   Outpatient                       SFA        SFA        [id number]

[id number]                                   [facility]

 

                                                    2023 

10:27:01                                2023 

10:27:01   Outpatient                       SFA        SFA        [id number]

[id number]                                   [facility]

 

                                                    2023 

08:42:15                                2023 

08:42:15   Outpatient                       SFA        SFA        [id number]

[id number]                                   [facility]

 

                                                    2023 

15:45:04                                2023 

15:45:04   Outpatient                       SFA        SFA        [id number]

[id number]                                   [facility]

## 2024-08-25 LAB
SQUAMOUS URNS QL MICRO: <5 /HPF
UA COMPLETE W REFLEX CULTURE PNL UR: (no result)
UA DIPSTICK W REFLEX MICRO PNL UR: (no result)

## 2024-08-25 RX ADMIN — SODIUM CHLORIDE AND POTASSIUM CHLORIDE SCH MLS: .9; .15 SOLUTION INTRAVENOUS at 01:46

## 2024-08-25 RX ADMIN — POTASSIUM CHLORIDE ONE: 10 TABLET, FILM COATED, EXTENDED RELEASE ORAL at 17:11

## 2024-08-25 RX ADMIN — AMLODIPINE BESYLATE SCH MG: 2.5 TABLET ORAL at 09:15

## 2024-08-25 RX ADMIN — ASPIRIN SCH MG: 81 TABLET, COATED ORAL at 06:05

## 2024-08-25 RX ADMIN — ALPRAZOLAM PRN MG: 0.5 TABLET ORAL at 16:06

## 2024-08-25 RX ADMIN — HYDRALAZINE HYDROCHLORIDE PRN MG: 20 INJECTION INTRAMUSCULAR; INTRAVENOUS at 21:28

## 2024-08-25 RX ADMIN — POTASSIUM CHLORIDE ONE MEQ: 10 TABLET, FILM COATED, EXTENDED RELEASE ORAL at 17:30

## 2024-08-25 RX ADMIN — ATORVASTATIN CALCIUM SCH MG: 40 TABLET, FILM COATED ORAL at 21:28

## 2024-08-25 NOTE — P.HP
Certification for Inpatient


Patient admitted to: Observation


With expected LOS: <2 Midnights


Practitioner: I am a practitioner with admitting privileges, knowledge of 

patient current condition, hospital course, and medical plan of care.


Services: Services provided to patient in accordance with Admission requirements

found in Title 42 Section 412.3 of the Code of Federal Regulations





Patient History


Date of Service: 08/25/24


Reason for admission: Facial droop 


History of Present Illness: 





49 yrs old Female with past medical history of hypertension came to ER with 

facial droop .  Started 4 days ago she noticed that she had a left-sided facial 

asymmetry associated with facial discomfort and also by the on the left side of 

the lip .  Patient started having these symptoms since Wednesday and was brought

to ER because she was anxious for there is a stroke.  Denies any headache.  No 

nausea vomiting or diarrhea.  No sick contacts.


No focal weakness of the body other than the face.  No fever or chills


Patient was assessed in the ER and was admitted for possibility to rule out CVA 





Allergies





No Known Allergies Allergy (Verified 02/07/23 09:23)


   





Home medications list reviewed: Yes


Home Medications: 








NK [No Home Meds]  08/25/24 








- Past Medical/Surgical History


Past Medical History: Reviewed- Non-Contributory


-: Hypertension


Past Surgical History: Reviewed- Non-Contributory





- Family History


Family History: Reviewed- Non-Contributory





- Social History


Smoking Status: Never smoker





Review of Systems


10-point ROS is otherwise unremarkable





Physical Examination





- Vital Signs


Temperature: 98.4 F


Blood Pressure: 222/100


Pulse: 68


Respirations: 18


Pulse Ox (%): 94





- Physical Exam


General: Alert, In no apparent distress, Oriented x3, Obese


HEENT: Atraumatic, Normocephalic


Neck: Supple, No Thyromegaly


Respiratory: Clear to auscultation bilaterally, Normal air movement


Cardiovascular: Regular rate/rhythm, Normal S1 S2


Capillary refill: <2 Seconds


Gastrointestinal: Soft and benign, Non-distended, W/out hepatosplenomegaly


Musculoskeletal: No clubbing, No swelling


Integumentary: No rashes, No breakdown


Neurological: Normal speech, Normal strength at 5/5 x4 extr, Other (Facial 

palsy), Abnormal cranial nerve function


Lymphatics: No axilla or inguinal lymphadenopathy





- Studies


Laboratory Data (last 24 hrs)











  08/24/24 08/24/24 08/24/24





  20:16 20:16 20:16


 


WBC   10.10 


 


Hgb   13.5 


 


Hct   40.5 


 


Plt Count   231 


 


PT  12.2  


 


INR  1.09  


 


Sodium    140


 


Potassium    2.9 L


 


BUN    8


 


Creatinine    0.57


 


Glucose    97


 


Magnesium    2.1


 


Total Bilirubin    0.3


 


AST    15


 


ALT    24


 


Alkaline Phosphatase    123 H











Assessment and Plan





- Plan





To rule out CVA/TIA


No focal weakness


Numbness of left-sided face


Started on aspirin and statin


CT CTA findings noted


21 mm subacute CVA right basal ganglia. No hemorrhage is seen.


MRI brain ordered


Monitor neuro vital signs


Monitor under telemetry


Neurology consult





? Bell's palsy


Supportive management


Started on steroids if MRI is negative





Accelerated hypertension


Antihypertensives titrated


Permissible hypertension for first 24 hours


We will get a lipid panel and an A1c





Obesity


Advise lifestyle modification





GI/DVT prophylaxis


Advanced directive full code











Discharge Plan: Home


Plan to discharge in: 48 Hours





- Advance Directives


Does patient have a Living Will: No


Does patient have a Durable POA for Healthcare: No





- Code Status/Comfort Care


Code Status: Full Code


Time Spent Managing Pts Care (In Minutes): 48

## 2024-08-25 NOTE — P.PN
Date of Service: 08/25/24


Patient seen and examined.





Left facial weakness.


No problem with swallowing speech is elevated slurred.





Blood pressure is significantly elevated with systolic in the 200s.





Plan:


Permissive hypertension.


Low-dose amlodipine to bring systolic blood pressure to less than 200.


Hydralazine as needed for BP spikes.


Aspirin, Plavix


DVT prophylaxis with Lovenox.


Echocardiogram is pending


MRI of the brain is pending.


Neurology consulted.


Neurochecks.


Patient is ambulatory and has no PT needs.

## 2024-08-26 LAB
ALBUMIN SERPL BCP-MCNC: 3 G/DL (ref 3.4–5)
ALBUMIN/GLOB SERPL: 0.9 {RATIO} (ref 1.1–1.8)
ALP SERPL-CCNC: 102 U/L (ref 45–117)
ALT SERPL W P-5'-P-CCNC: 19 U/L (ref 13–56)
ANION GAP SERPL CALC-SCNC: 5.4 MEQ/L (ref 5–15)
AST SERPL W P-5'-P-CCNC: 11 U/L (ref 15–37)
BUN BLD-MCNC: 9 MG/DL (ref 7–18)
GLOBULIN SER CALC-MCNC: 3.5 G/DL (ref 2.3–3.5)
GLUCOSE SERPLBLD-MCNC: 102 MG/DL (ref 74–106)
HCT VFR BLD CALC: 36.7 % (ref 36–45)
HGB BLD-MCNC: 12.4 G/DL (ref 12–15)
LYMPHOCYTES # SPEC AUTO: 3.2 K/UL (ref 0.7–4.9)
MCH RBC QN AUTO: 30.5 PG (ref 27–35)
MCHC RBC AUTO-ENTMCNC: 33.8 G/DL (ref 32–36)
MCV RBC: 90.1 FL (ref 80–100)
NRBC # BLD: 0 10*3/UL (ref 0–0)
NRBC BLD AUTO-RTO: 0.3 % (ref 0–0)
PMV BLD: 9.7 FL (ref 7.6–11.3)
POTASSIUM SERPL-SCNC: 3.4 MEQ/L (ref 3.5–5.1)
RBC # BLD: 4.07 M/UL (ref 3.86–4.86)
WBC # BLD AUTO: 8.2 THOU/UL (ref 4.3–10.9)

## 2024-08-26 RX ADMIN — AMLODIPINE BESYLATE SCH MG: 10 TABLET ORAL at 20:29

## 2024-08-26 RX ADMIN — POTASSIUM CHLORIDE ONE MEQ: 10 TABLET, FILM COATED, EXTENDED RELEASE ORAL at 12:47

## 2024-08-26 RX ADMIN — AMLODIPINE BESYLATE SCH MG: 5 TABLET ORAL at 09:35

## 2024-08-26 RX ADMIN — CLOPIDOGREL BISULFATE SCH MG: 75 TABLET, FILM COATED ORAL at 09:36

## 2024-08-26 RX ADMIN — ENOXAPARIN SODIUM SCH MG: 40 INJECTION SUBCUTANEOUS at 14:19

## 2024-08-26 NOTE — RAD REPORT
EXAM DESCRIPTION:  MRI - Brain Wo Cont - 8/26/2024 9:01 am

 

CLINICAL HISTORY:  CVA

 

COMPARISON:  Noncontrast head CT and CT angiogram 08/24/2024

 

TECHNIQUE:  Multiplanar multisequence MRI of the brain performed without IV contrast.

 

FINDINGS:  Motion artifact somewhat limits evaluation, despite attempts at repeat imaging.

 

Focus of diffusion restriction involving the right lentiform nucleus posteriorly extending along the 
centrum semiovale, corresponding to the focus of hypoattenuation seen on the prior CT. There is corre
sponding T2/FLAIR hyperintensity. Stable associated minimal mass effect. No other diffusion signal ab
normality.

 

No evidence of acute intracranial hemorrhage or abnormal extra-axial fluid collections.

 

Ventricular caliber within normal for age. Incidentally noted partially empty sella. Midline structur
es are otherwise unremarkable.

 

Other minimal scattered subcortical and deep white matter T2/FLAIR hyperintensities, nonspecific, but
 suggestive of chronic small vessel ischemic changes.

 

No other mass effect or midline shift.

 

Major vascular flow voids are preserved.

 

Patchy opacification of the mastoid air cells more so on the right. Visualized paranasal sinuses are 
well aerated.

 

 

IMPRESSION:  Late acute to subacute infarct involving the right lentiform nucleus and centrum semiova
le. No evidence of hemorrhagic transformation or worsening mass effect.

 

No other acute intracranial findings.

## 2024-08-26 NOTE — P.PN
Subjective


Date of Service: 08/26/24


Chief Complaint: Facial droop 


Patient's left facial droop noted to be improved.


She denies any dysphagia.


She denies any limb weakness.








Physical Examination





- Vital Signs


Temperature: 97.8 F


Blood Pressure: 187/99


Pulse: 77


Respirations: 16


Pulse Ox (%): 99





Assessment And Plan





- Plan


Physical Examination


General: Alert and oriented x 3, not in acute distress.  Obese.


HEENT: PERRLA, EOMI, anicteric sclera, conjunctiva not pale.


Neck: Supple, no elevated JVD, no thyromegaly.


Lungs: Clear to auscultation bilaterally.  No rhonchi, no rales, no crackles.


Heart: S1-S2 heard, rapid, no murmur no gallop no rub.  Normal capillary refill.


Abdomen: Soft, nontender, nondistended, no hepatosplenomegaly.


Extremities: No pedal edema.  No deformity.


Neuro: Left facial weakness, no limb weakness, power 5/5 in all extremities.


Psychiatry: Awake, normal behavior, normal affect. 


Skin: Warm and dry, no rashes.








Acute CVA


Left facial weakness


MRI of the brain report late acute to subacute infarct involving the right 

lentiform nucleus and centrum 


semiovale


Echocardiogram is pending


Continue aspirin, Plavix and statin


Neuro checks.


Patient is ambulating independently and has no PT needs, she has no problem with

swallowing and has no speech needs.


Neurology consulted


Check lipid panel





Accelerated hypertension


Amlodipine 10 mg daily.


Hydralazine as needed for BP spikes.





Morbid obesity


Weight loss by diet and exercise advised.





GI/DVT prophylaxis: Lovenox


Advanced directive full code

## 2024-08-26 NOTE — EKG
Test Date:    2024-08-24               Test Time:    20:22:55

Technician:                                        

                                                     

MEASUREMENT RESULTS:                                       

Intervals:                                           

Rate:         65                                     

CA:           142                                    

QRSD:         90                                     

QT:           420                                    

QTc:          436                                    

Axis:                                                

P:            -6                                     

CA:           142                                    

QRS:          86                                     

T:            27                                     

                                                     

INTERPRETIVE STATEMENTS:                                       

                                                     

Normal sinus rhythm

Nonspecific T wave abnormality

Abnormal ECG

Compared to ECG 02/07/2023 09:35:14

T-wave abnormality now present



Electronically Signed On 08-26-24 12:38:20 CDT by Gabriel Durbin

## 2024-08-27 VITALS — SYSTOLIC BLOOD PRESSURE: 163 MMHG | DIASTOLIC BLOOD PRESSURE: 96 MMHG | TEMPERATURE: 97.5 F

## 2024-08-27 VITALS — OXYGEN SATURATION: 97 %

## 2024-08-27 LAB
ANION GAP SERPL CALC-SCNC: 6.7 MEQ/L (ref 5–15)
BUN BLD-MCNC: 10 MG/DL (ref 7–18)
GLUCOSE SERPLBLD-MCNC: 97 MG/DL (ref 74–106)
HDLC SERPL-MCNC: 41 MG/DL (ref 40–60)
LDLC SERPL CALC-MCNC: 60 MG/DL (ref ?–130)
LDLC SERPL-MCNC: 60 MG/DL
MAGNESIUM SERPL-MCNC: 1.9 MG/DL (ref 1.6–2.4)
POTASSIUM SERPL-SCNC: 3.7 MEQ/L (ref 3.5–5.1)

## 2024-08-27 RX ADMIN — POTASSIUM CHLORIDE ONE MEQ: 10 TABLET, FILM COATED, EXTENDED RELEASE ORAL at 10:44

## 2024-08-27 NOTE — P.DS
Admission Date: 08/26/24


Discharge Date: 08/27/24


Disposition: ROUTINE DISCHARGE


Discharge Condition: GOOD


Reason for Admission: Facial droop 


Consultations: 





NeurologyDr. Lundberg


Brief History of Present Illness: 





49 yrs old Female with past medical history of hypertension came to ER with 

facial droop .  Started 4 days ago she noticed that she had a left-sided facial 

asymmetry associated with facial discomfort and also by the on the left side of 

the lip .  Patient started having these symptoms since Wednesday and was brought

to ER because she was anxious for there is a stroke.  Denies any headache.  No 

nausea vomiting or diarrhea.  No sick contacts.


No focal weakness of the body other than the face.  No fever or chills


Patient was assessed in the ER and was admitted for possibility to rule out CVA 








Hospital Course: 








Assessment


Acute CVA


Left facial weakness


Accelerated hypertension


Morbid obesity





Patient was admitted to the hospital for left-sided facial droop.  She is also 

noted to be markedly hypertensive with initial blood pressure of 235/101.  

During her hospitalization an MRI was obtained which showed late acute to 

subacute infarct involving the right lentiform nucleus and centrum semieval, no 

evidence of hemorrhagic transformation or worsening mass effect.  CTA of the 

head and neck were obtained which were negative for large vessel occlusion or 

other significant findings.





At home patient was not on any blood pressure medications prior to 

hospitalization, she was started on carvedilol 3.125 mg by mouth twice daily, 

Norvasc 10 mg daily as well as aspirin, Plavix, atorvastatin.  She still having 

some left-sided facial droop but otherwise no other neurological deficits are 

present.  Echo was obtained prior to discharge from the hospital.








Please follow-up with your primary care doctor in 1 week


Please also follow-up with neurologyDr. Lundberg in 1 to 2 weeks





Prescriptions for your new blood pressure, cholesterol and blood thinning 

medication were sent to [facility] in [place].





Please also take a baby aspirin daily 81 mg over-the-counter.











Physical Examination


General: Alert and oriented x 3, not in acute distress.  Obese.


HEENT: PERRLA, EOMI, anicteric sclera, conjunctiva not pale.


Neck: Supple, no elevated JVD, no thyromegaly.


Lungs: Clear to auscultation bilaterally.  No rhonchi, no rales, no crackles.


Heart: S1-S2 heard, rapid, no murmur no gallop no rub.  Normal capillary refill.


Abdomen: Soft, nontender, nondistended, no hepatosplenomegaly.


Extremities: No pedal edema.  No deformity.


Neuro: Left facial weakness, no limb weakness, power 5/5 in all extremities.


Psychiatry: Awake, normal behavior, normal affect. 


Skin: Warm and dry, no rashes.








Vital Signs/Physical Exam: 














Temp Pulse Resp BP Pulse Ox


 


 97.5 F   74   16   163/96 H  95 


 


 08/27/24 12:00  08/27/24 12:00  08/27/24 12:00  08/27/24 12:00  08/27/24 12:00








General: Alert, In no apparent distress, Oriented x3


HEENT: Atraumatic, PERRLA, EOMI


Neck: Supple, JVD not distended


Respiratory: Clear to auscultation bilaterally, Normal air movement


Cardiovascular: Regular rate/rhythm, Normal S1 S2


Gastrointestinal: Normal bowel sounds, No tenderness


Musculoskeletal: No tenderness


Integumentary: No rashes


Neurological: Normal affect, Other (left facial droop, NIH-1)


Lymphatics: No axilla or inguinal lymphadenopathy


Laboratory Data at Discharge: 














WBC  8.20 thou/uL (4.3-10.9)   08/26/24  06:11    


 


Hgb  12.4 g/dL (12.0-15.0)   08/26/24  06:11    


 


Hct  36.7 % (36.0-45.0)   08/26/24  06:11    


 


Plt Count  201 thou/uL (152-406)   08/26/24  06:11    


 


PT  12.2 SECONDS (9.4-12.5)   08/24/24  20:16    


 


INR  1.09   08/24/24  20:16    


 


Sodium  139 mEq/L (136-145)   08/27/24  05:04    


 


Potassium  3.7 mEq/L (3.5-5.1)   08/27/24  05:04    


 


BUN  10 mg/dL (7-18)   08/27/24  05:04    


 


Creatinine  0.54 mg/dL (0.55-1.02)  L  08/27/24  05:04    


 


Glucose  97 mg/dL ()   08/27/24  05:04    


 


Magnesium  1.9 mg/dL (1.6-2.4)   08/27/24  05:04    


 


Total Bilirubin  0.4 mg/dL (0.2-1.0)   08/26/24  06:11    


 


AST  11 U/L (15-37)  L  08/26/24  06:11    


 


ALT  19 U/L (13-56)   08/26/24  06:11    


 


Alkaline Phosphatase  102 U/L ()   08/26/24  06:11    


 


Triglycerides  57 mg/dL (<150)   08/27/24  05:04    


 


Cholesterol  112 mg/dL (<200)   08/27/24  05:04    


 


HDL Cholesterol  41 mg/dL (40-60)   08/27/24  05:04    


 


Cholesterol/HDL Ratio  2.73   08/27/24  05:04    








Home Medications: 








Amlodipine [Norvasc*] 10 mg PO DAILY #30 tab 08/27/24 


Atorvastatin Calcium [Lipitor] 40 mg PO BEDTIME #30 tab 08/27/24 


Clopidogrel Bisulfate [Plavix*] 75 mg PO DAILY #30 tab 08/27/24 


carvediloL [Coreg*] 3.125 mg PO BID 6AM 6PM #60 tab 08/27/24 





New Medications: 


carvediloL [Coreg*] 3.125 mg PO BID 6AM 6PM #60 tab


Atorvastatin Calcium [Lipitor] 40 mg PO BEDTIME #30 tab


Amlodipine [Norvasc*] 10 mg PO DAILY #30 tab


Clopidogrel Bisulfate [Plavix*] 75 mg PO DAILY #30 tab


Physician Discharge Instructions: 


Patient was admitted to the hospital for left-sided facial droop.  She is also 

noted to be markedly hypertensive with initial blood pressure of 235/101.  

During her hospitalization an MRI was obtained which showed late acute to 

subacute infarct involving the right lentiform nucleus and centrum semieval, no 

evidence of hemorrhagic transformation or worsening mass effect.  CTA of the 

head and neck were obtained which were negative for large vessel occlusion or 

other significant findings.





At home patient was not on any blood pressure medications prior to 

hospitalization, she was started on carvedilol 3.125 mg by mouth twice daily, 

Norvasc 10 mg daily as well as aspirin, Plavix, atorvastatin.  She still having 

some left-sided facial droop but otherwise no other neurological deficits are 

present.  Echo was obtained prior to discharge from the hospital.








Please follow-up with your primary care doctor in 1 week


Please also follow-up with Rhiannon Lundberg in 1 to 2 weeks





Prescriptions for your new blood pressure, cholesterol and blood thinning 

medication were sent to [facility] in [place].





Please also take a baby aspirin daily 81 mg over-the-counter.





Activity: Ad melia


Followup: 


Mansoor Lundberg MD [ASSOCIATE-ACTIVE - CAN ADMIT] - 1-2 Weeks


NONE,NONE [Primary Care Provider] - 1 Week


Time spent managing pt's care (in minutes): 36

## 2024-08-27 NOTE — CON
Reason For Consultation:  Consultation called because of stroke.



History Of Present Illness:  Ms. Zamora is a 49-year-old  patient with uncontrolled hypertensi
on, morbid obesity, who developed left facial droop about 4 days prior to her coming to hospital.  He
r symptoms did not improve.  She was losing liquids from the corner of the mouth and was brought to [facility] because of anxiety.  Her imaging identified on MRI, an acute to subacute stroke.  
This was after a CT scan on the day of admission identified a 21 mm subacute right basal ganglia stro
ke.  The patient was well out of the window for TNKs and any intra-arterial thrombolysis.  CT angiogr
am of her neck identified no significant abnormalities.  CT angiogram of her head showed no significa
nt flow abnormalities.  She was not taking aspirin or any antiplatelet medications and had elevated b
lood pressures.  She was put on aspirin 81 mg daily, Plavix 75 mg daily along with Lovenox for DVT pr
ophylaxis and Norvasc with Coreg for blood pressure control.  Since onset, she has noted some moderat
e improvement in her symptoms involving the face.  She denies any significant weakness in the extremi
ties, any sensory loss in the extremities.  There is some facial decreased sensation. 



Her complete blood count differential is completely normal.  Coagulation panel is normal.  Her compre
hensive metabolic panel shows elevated glucose ranging up to 127.  Potassium was low at 3.4, otherwis
e liver function studies unremarkable.  Urinalysis is essentially unremarkable.



Past Medical History:  Hypertension.



Allergies:  NO KNOWN DRUG ALLERGIES.



Medications:  At home, she was not taking medications.



Family History:  Positive for stroke in father.



Social History:  The patient is a smoker.  Denies illegal drugs or alcohol use.



Current Medications:  Tylenol 650 every 4 hours as needed, Xanax 0.5 mg 3 times daily as needed, Norv
asc 10 mg daily, aspirin 81 mg daily, Lipitor 40 mg at bedtime, Coreg 3.125 mg twice daily, Plavix 75
 mg daily, Lovenox 40 mg subcutaneously daily, Zofran 4 mg every 6 hours as needed, Apresoline 10 mg 
as needed for systolic blood pressure greater than 170.  Does have potassium replacement on board.



Review of Systems:

Aside from mentioned above, she denies any prior nausea, vomiting, myalgias, arthralgias, rash, heada
evaristo, weight change.  Since symptom onset, had nausea, which is improving, some improvement in her lef
t facial droop, otherwise unremarkable.



Physical Examination:

Vital Signs:  Blood pressure 187/99, pulse 77, respiratory rate 16, temperature 97.8, oxygen saturati
on 99%. 

General:  Ms. Zamora is resting comfortably in bed. 

HEENT:  She is normocephalic, atraumatic.  Sclerae anicteric.  Oropharynx is pink and moist. 

Neck:  Supple. 

Chest:  Clear. 

Heart:  Regular. 

Extremities:  Show no clubbing, cyanosis, or edema.  Note, she has class 3 obesity, BMI 45.1. 

Neurologic:  Cranial nerves examination, she has a decreased left nasolabial fold with fair excursion
s and smiling and mild decreased sensation of left face compared to right side.  Otherwise, cranial n
erves intact.  Motor examination, subtle weakness of left upper compared to right upper extremity arie
und 5-/5 in the right side, 5/5 in lower extremity, 5/5 proximally distally.  Sensation decreased sli
ghtly in the left compared to right upper extremity.  Coordination intact in both upper and lower ext
remities.  Gait has good stance, stride, and arm swing.



Assessment:  Ms. Zamora is a 49-year-old patient with a right basal ganglia stroke on a CT scan, which
 on brain MRI shows it to be late acute to subacute in the right lentiform nucleus and centrum semiov
james.  There was no hemorrhagic conversion.  Risk factors are uncontrolled hypertension and class 3 ob
esity along with cigarette smoking.  In addition, she has family history of stroke.



Plan:  Aspirin, Plavix, folic acid, and statin as noted.  Manage blood pressures to keep less than 18
0 systolic and around 130-140 over the next several days.  May normalize in about 2-3 weeks from the 
acute stroke.  She is doing very well.  Does not need physical therapy, occupational therapy, but out
patient speech may be considered.  She may be discharged and follow up in Dr. Lundberg's clinic withi
n the month.





BENY/AGUEDA

DD:  08/26/2024 20:35:41Voice ID:  [id number]

DT:  08/27/2024 00:31:24Report ID:  [id number]

## 2024-08-28 NOTE — ECHO
HEIGHT: 5 ft 2 in   WEIGHT: 246 lb 8 oz   DATE OF STUDY: 08/27/2024   REFER DR: Tim Rea MD

2-DIMENSIONAL: YES

     M.MODE: YES

 DOPPLER: YES

COLOR FLOW: YES



                    TDS:  

PORTABLE: YES

 DEFINITY:  

BUBBLE STUDY: 





DIAGNOSIS:  ACUTE CEREBRAL VASCULAR ACCIDENT



CARDIAC HISTORY:  

CATHERIZATION: NO

SURGERY: NO

PROSTHETIC VALVE: NO

PACEMAKER: NO





MEASUREMENTS (cm)

    DIASTOLIC (NORMALS)                 SYSTOLIC (NORMALS)

IVSd                 1.0 (0.6-1.2)                    LA Diam 3.6 (1.9-4.0)     LVEF       
  60-65%  

LVIDd               4.7 (3.5-5.7)                        LVIDs      2.9 (2.0-3.5)     %FS  
        37%

LVPWd             1.1 (0.6-1.2)

Ao Diam           2.9 (2.0-3.7)



2 DIMENSIONAL ASSESSMENT:

RIGHT ATRIUM:                   NORMAL

LEFT ATRIUM:       NORMAL



RIGHT VENTRICLE:            NORMAL

LEFT VENTRICLE: NORMAL



TRICUSPID VALVE:             NORMAL

MITRAL VALVE:     NORMAL



PULMONIC VALVE:             NORMAL

AORTIC VALVE:     NORMAL



PERICARDIAL EFFUSION: NONE

AORTIC ROOT:      NORMAL





LEFT VENTRICULAR WALL MOTION:     NORMAL



DOPPLER/COLOR FLOW:     NORMAL



COMMENTS:      

  1. NORMAL LEFT VENTRICULAR SYSTOLIC FUNCTION, NORMAL WALL MOTION, 

EJECTION FRACTION 60-65%

  2. NORMAL DIASTOLIC FUNCTION



TECHNOLOGIST:   ELVIN BLANCAS